# Patient Record
Sex: FEMALE | URBAN - METROPOLITAN AREA
[De-identification: names, ages, dates, MRNs, and addresses within clinical notes are randomized per-mention and may not be internally consistent; named-entity substitution may affect disease eponyms.]

---

## 2017-11-03 ENCOUNTER — OUTPATIENT (OUTPATIENT)
Dept: OUTPATIENT SERVICES | Facility: HOSPITAL | Age: 52
LOS: 1 days | Discharge: HOME | End: 2017-11-03

## 2017-11-03 DIAGNOSIS — M21.6X1 OTHER ACQUIRED DEFORMITIES OF RIGHT FOOT: ICD-10-CM

## 2017-11-03 DIAGNOSIS — Z01.818 ENCOUNTER FOR OTHER PREPROCEDURAL EXAMINATION: ICD-10-CM

## 2017-11-03 DIAGNOSIS — M21.611 BUNION OF RIGHT FOOT: ICD-10-CM

## 2017-11-17 ENCOUNTER — OUTPATIENT (OUTPATIENT)
Dept: OUTPATIENT SERVICES | Facility: HOSPITAL | Age: 52
LOS: 1 days | Discharge: HOME | End: 2017-11-17

## 2017-11-21 DIAGNOSIS — M21.621 BUNIONETTE OF RIGHT FOOT: ICD-10-CM

## 2017-11-21 DIAGNOSIS — Z87.891 PERSONAL HISTORY OF NICOTINE DEPENDENCE: ICD-10-CM

## 2017-11-21 DIAGNOSIS — M21.6X1 OTHER ACQUIRED DEFORMITIES OF RIGHT FOOT: ICD-10-CM

## 2019-12-13 PROBLEM — Z00.00 ENCOUNTER FOR PREVENTIVE HEALTH EXAMINATION: Status: ACTIVE | Noted: 2019-12-13

## 2022-03-28 ENCOUNTER — APPOINTMENT (OUTPATIENT)
Dept: OTOLARYNGOLOGY | Facility: CLINIC | Age: 57
End: 2022-03-28
Payer: COMMERCIAL

## 2022-03-28 VITALS — WEIGHT: 136 LBS | BODY MASS INDEX: 21.35 KG/M2 | HEIGHT: 67 IN

## 2022-03-28 DIAGNOSIS — J34.89 OTHER SPECIFIED DISORDERS OF NOSE AND NASAL SINUSES: ICD-10-CM

## 2022-03-28 DIAGNOSIS — K21.9 GASTRO-ESOPHAGEAL REFLUX DISEASE W/OUT ESOPHAGITIS: ICD-10-CM

## 2022-03-28 DIAGNOSIS — Z78.9 OTHER SPECIFIED HEALTH STATUS: ICD-10-CM

## 2022-03-28 DIAGNOSIS — R07.0 PAIN IN THROAT: ICD-10-CM

## 2022-03-28 PROCEDURE — 99204 OFFICE O/P NEW MOD 45 MIN: CPT | Mod: 25

## 2022-03-28 PROCEDURE — 31575 DIAGNOSTIC LARYNGOSCOPY: CPT

## 2022-03-28 NOTE — HISTORY OF PRESENT ILLNESS
[de-identified] : Patient presents today with c/o throat discomfort. Patient admits present for 3 months. Only happens when going to bed. She feels an irritation and dryness in her throat. hx of asthma followed by pulmonologist on breo daily. \par Patient also c/o nasal obstruction on the left nostril. No nasal injury. Currently not on any nasal sprays or allergy medications. \par

## 2022-03-28 NOTE — PHYSICAL EXAM
[Nasal Endoscopy Performed] : nasal endoscopy was performed, see procedure section for findings [Normal] : mucosa is normal [Midline] : trachea located in midline position [de-identified] : edema

## 2022-05-09 ENCOUNTER — APPOINTMENT (OUTPATIENT)
Dept: OTOLARYNGOLOGY | Facility: CLINIC | Age: 57
End: 2022-05-09

## 2022-07-20 ENCOUNTER — NON-APPOINTMENT (OUTPATIENT)
Age: 57
End: 2022-07-20

## 2024-05-20 ENCOUNTER — APPOINTMENT (OUTPATIENT)
Dept: CARDIOLOGY | Facility: CLINIC | Age: 59
End: 2024-05-20

## 2024-06-07 ENCOUNTER — APPOINTMENT (OUTPATIENT)
Dept: CARDIOLOGY | Facility: CLINIC | Age: 59
End: 2024-06-07
Payer: COMMERCIAL

## 2024-06-07 VITALS
DIASTOLIC BLOOD PRESSURE: 80 MMHG | HEART RATE: 72 BPM | HEIGHT: 67 IN | BODY MASS INDEX: 19.78 KG/M2 | WEIGHT: 126 LBS | SYSTOLIC BLOOD PRESSURE: 122 MMHG

## 2024-06-07 DIAGNOSIS — Z87.891 PERSONAL HISTORY OF NICOTINE DEPENDENCE: ICD-10-CM

## 2024-06-07 DIAGNOSIS — R06.02 SHORTNESS OF BREATH: ICD-10-CM

## 2024-06-07 DIAGNOSIS — Z82.49 FAMILY HISTORY OF ISCHEMIC HEART DISEASE AND OTHER DISEASES OF THE CIRCULATORY SYSTEM: ICD-10-CM

## 2024-06-07 DIAGNOSIS — R07.9 CHEST PAIN, UNSPECIFIED: ICD-10-CM

## 2024-06-07 DIAGNOSIS — Z82.3 FAMILY HISTORY OF STROKE: ICD-10-CM

## 2024-06-07 PROCEDURE — 93000 ELECTROCARDIOGRAM COMPLETE: CPT

## 2024-06-07 PROCEDURE — 99204 OFFICE O/P NEW MOD 45 MIN: CPT | Mod: 25

## 2024-06-07 NOTE — REVIEW OF SYSTEMS
[SOB] : shortness of breath [Chest Discomfort] : chest discomfort [Negative] : Heme/Lymph [Lower Ext Edema] : no extremity edema [Leg Claudication] : no intermittent leg claudication [Palpitations] : no palpitations [Confusion] : no confusion was observed

## 2024-06-07 NOTE — HISTORY OF PRESENT ILLNESS
[FreeTextEntry1] : 59-year-old with a PMHx of asthma, HLD. FMHx: Father  at 57yo of MI  PCP Dr. Hitchcock.   Pt presents for a cardiac evaluation. She is c/o episodes of midsternal chest pressure and left upper back pain, she also reports episodes of SOB at rest where she has to take a deep breath. She exercises in the gym and takes walks with her dogs.   TSH 1.84

## 2024-06-07 NOTE — ASSESSMENT
[FreeTextEntry1] : 59 YOF with mild HLD, FH of CAD. Episodes of chest pain and SOB.  Plan: Low-fat diet discussed. Exercise stress ECHO. F/u after the test.  Gustavo Serrato MD

## 2024-07-01 ENCOUNTER — APPOINTMENT (OUTPATIENT)
Dept: CARDIOLOGY | Facility: CLINIC | Age: 59
End: 2024-07-01
Payer: COMMERCIAL

## 2024-07-01 DIAGNOSIS — R06.02 SHORTNESS OF BREATH: ICD-10-CM

## 2024-07-01 DIAGNOSIS — R07.9 CHEST PAIN, UNSPECIFIED: ICD-10-CM

## 2024-07-01 PROCEDURE — 93325 DOPPLER ECHO COLOR FLOW MAPG: CPT

## 2024-07-01 PROCEDURE — 93351 STRESS TTE COMPLETE: CPT

## 2024-07-01 PROCEDURE — 93320 DOPPLER ECHO COMPLETE: CPT

## 2024-08-02 ENCOUNTER — APPOINTMENT (OUTPATIENT)
Dept: UROLOGY | Facility: CLINIC | Age: 59
End: 2024-08-02

## 2024-08-05 ENCOUNTER — APPOINTMENT (OUTPATIENT)
Dept: UROLOGY | Facility: CLINIC | Age: 59
End: 2024-08-05

## 2024-08-05 PROBLEM — R35.0 URINARY FREQUENCY: Status: ACTIVE | Noted: 2024-08-05

## 2024-08-05 PROBLEM — N20.0 NEPHROLITHIASIS: Status: ACTIVE | Noted: 2024-08-05

## 2024-08-05 PROCEDURE — 81003 URINALYSIS AUTO W/O SCOPE: CPT | Mod: QW

## 2024-08-05 PROCEDURE — 99204 OFFICE O/P NEW MOD 45 MIN: CPT | Mod: 25

## 2024-08-05 PROCEDURE — G2211 COMPLEX E/M VISIT ADD ON: CPT | Mod: NC

## 2024-08-05 NOTE — ASSESSMENT
[FreeTextEntry1] : JIMENA ALVARADO is a 59-year-old female who presents for consultation for left cortical calcification, possible stone, LUTS.  Suspecting patient's right lower back pain was musculoskeletal. Patient is currently asymptomatic. I explained to the patient that differential diagnosis includes sonogram artifact versus parenchymal calcification versus stone.  In light of the fact that she has no symptoms, we will observe for now to avoid radiation however if she develops symptoms in the future will recommend repeating imaging earlier. We also discussed risks of obstruction with observation of stones, however this calcification appears small. Today we also focused on stone dietary prevention Regarding her chronic urinary frequency, does not particularly bothersome and she will work on decreasing bladder irritants. UA UCx today  - f/u 1 year with RBUS and KUB prior. If symptoms worsen will have f/u sooner, consider CT AP at that point.   Stone nutritional counseling given-- She will DC MULTIVITAMINS First and foremost, the most important recommendation is to increase water intake. I have recommended that patient drink enough water to produce 2.5L of urine per day. More easily put, I have recommended the patient consume enough water to keep urine clear. I have also recommended adding crystal light (or lemon) which contains citrate which prevents stone formation. I have also stressed the importance of minimizing salt and animal protein in the diet.

## 2024-08-05 NOTE — HISTORY OF PRESENT ILLNESS
[FreeTextEntry1] : JIMENA ALVARADO is a 59-year-old female who presents for consultation for left cortical calcification, possible stone, LUTS.  Pt was having liver testing done where kidney stone was incidentally detected. She was monitoring this stone and developed right sided positional lower back pain last month. This occurred after lifting a heavy object and pain has since resolved. This prompted her making appt.  She has had chronic urinary frequency, nocturia 0-1x, no urgency or other symptoms. She has significant fluid intake such as coffee and tea. Denies flank pain, gross hematuria, dysuria or associated symptoms.   RBUS 02/07/2024 - images independently reviewed by me - On my read, I agree with the findings. this appears to be a peripheral cortical calcification.  IMPRESSION: Stable 4 mm cortical calcification in the interpolar right kidney. Otherwise, unremarkable appearance of both kidneys. PVR 7 cc.     history: Denies previous kidney stones, recurrent UTIs. No instrumentation. Family History: denies  malignancies. Social History: [place]ian speaking, works as

## 2024-08-05 NOTE — ADDENDUM
[FreeTextEntry1] : Patient's note was transcribed with the assistance of a medical scribe under the supervision of Dr. Yancey. I, Dr. Yancey, have reviewed the patient's chart and agree that it aligns with my medical decisions. Williams Rico, our scribe, also served as a chaperone for physical examination purposes.

## 2024-08-06 ENCOUNTER — APPOINTMENT (OUTPATIENT)
Dept: CARDIOLOGY | Facility: CLINIC | Age: 59
End: 2024-08-06

## 2024-08-06 PROBLEM — I34.1 MITRAL VALVE PROLAPSE: Status: ACTIVE | Noted: 2024-08-06

## 2024-08-06 PROBLEM — J45.909 ASTHMA: Status: ACTIVE | Noted: 2024-08-06

## 2024-08-06 PROBLEM — I25.3 ATRIAL SEPTAL ANEURYSM: Status: ACTIVE | Noted: 2024-08-06

## 2024-08-06 PROBLEM — E78.5 HYPERLIPIDEMIA: Status: ACTIVE | Noted: 2024-08-06

## 2024-08-06 PROCEDURE — 99214 OFFICE O/P EST MOD 30 MIN: CPT | Mod: 25

## 2024-08-06 PROCEDURE — 93000 ELECTROCARDIOGRAM COMPLETE: CPT

## 2024-08-06 NOTE — REVIEW OF SYSTEMS
[SOB] : shortness of breath [Chest Discomfort] : chest discomfort [Negative] : Gastrointestinal [Lower Ext Edema] : no extremity edema [Leg Claudication] : no intermittent leg claudication [Palpitations] : no palpitations

## 2024-08-06 NOTE — HISTORY OF PRESENT ILLNESS
[FreeTextEntry1] : 59-year-old with a PMHx of asthma, HLD. FMHx: Father  at 55yo of MI  PCP Dr. Hitchcock.   Pt presents for follow up visit. Pt reports chest tightness. Denies SOB since last visit. Pt has hx of asthma, does not use inhalers states its controlled, used to follow with pulmonologist. Pt reports exercise regularly in the gym and takes walks with her dogs.   24:  MAHOGANY: -13 min, peak HR 167bpm 103% MPHR, 13.6 METs -Resting ECG revealed sinus rhythm with prior lateral infarct, RVCD and no significant ST changes. At peak stress, the electrocardiogram revealed sinus tachycardia with no significant ischemic ST segment changes. No echocardiographic evidence of exercise induced ischemia. -LVEF 61%, interatrial septum is aneurysmal, prolapse of the middle scallop (P2) of the posterior mitral leaflet, mild MR, mild TR    TSH 1.84

## 2025-08-09 ENCOUNTER — NON-APPOINTMENT (OUTPATIENT)
Age: 60
End: 2025-08-09

## 2025-08-11 ENCOUNTER — APPOINTMENT (OUTPATIENT)
Dept: UROLOGY | Facility: CLINIC | Age: 60
End: 2025-08-11

## 2025-08-21 ENCOUNTER — APPOINTMENT (OUTPATIENT)
Dept: CARDIOLOGY | Facility: CLINIC | Age: 60
End: 2025-08-21
Payer: COMMERCIAL

## 2025-08-21 VITALS
HEIGHT: 67 IN | SYSTOLIC BLOOD PRESSURE: 128 MMHG | HEART RATE: 58 BPM | BODY MASS INDEX: 19.3 KG/M2 | DIASTOLIC BLOOD PRESSURE: 92 MMHG | WEIGHT: 123 LBS

## 2025-08-21 DIAGNOSIS — I25.3 ANEURYSM OF HEART: ICD-10-CM

## 2025-08-21 DIAGNOSIS — R07.9 CHEST PAIN, UNSPECIFIED: ICD-10-CM

## 2025-08-21 DIAGNOSIS — E78.5 HYPERLIPIDEMIA, UNSPECIFIED: ICD-10-CM

## 2025-08-21 DIAGNOSIS — I34.1 NONRHEUMATIC MITRAL (VALVE) PROLAPSE: ICD-10-CM

## 2025-08-21 DIAGNOSIS — R06.02 SHORTNESS OF BREATH: ICD-10-CM

## 2025-08-21 PROCEDURE — 99214 OFFICE O/P EST MOD 30 MIN: CPT | Mod: 25

## 2025-08-21 PROCEDURE — 93000 ELECTROCARDIOGRAM COMPLETE: CPT

## 2025-08-21 RX ORDER — PSYLLIUM HUSK 0.4 G
CAPSULE ORAL
Refills: 0 | Status: ACTIVE | COMMUNITY

## 2025-08-21 RX ORDER — MULTIVIT-MIN/IRON/FOLIC ACID/K 18-600-40
CAPSULE ORAL
Refills: 0 | Status: ACTIVE | COMMUNITY

## 2025-08-21 RX ORDER — PROGESTERONE 200 MG/1
CAPSULE ORAL
Refills: 0 | Status: ACTIVE | COMMUNITY

## 2025-08-21 RX ORDER — ESTRADIOL 0.03 MG/D
0.03 PATCH, EXTENDED RELEASE TRANSDERMAL
Refills: 0 | Status: ACTIVE | COMMUNITY

## 2025-08-21 RX ORDER — CHROMIUM 200 MCG
TABLET ORAL DAILY
Refills: 0 | Status: ACTIVE | COMMUNITY

## 2025-09-04 ENCOUNTER — APPOINTMENT (OUTPATIENT)
Dept: OTOLARYNGOLOGY | Facility: CLINIC | Age: 60
End: 2025-09-04
Payer: COMMERCIAL

## 2025-09-04 ENCOUNTER — NON-APPOINTMENT (OUTPATIENT)
Age: 60
End: 2025-09-04

## 2025-09-04 VITALS — WEIGHT: 125 LBS | BODY MASS INDEX: 19.62 KG/M2 | HEIGHT: 67 IN

## 2025-09-04 DIAGNOSIS — R07.0 PAIN IN THROAT: ICD-10-CM

## 2025-09-04 DIAGNOSIS — K21.9 GASTRO-ESOPHAGEAL REFLUX DISEASE W/OUT ESOPHAGITIS: ICD-10-CM

## 2025-09-04 PROCEDURE — 99203 OFFICE O/P NEW LOW 30 MIN: CPT | Mod: 25

## 2025-09-04 PROCEDURE — 31575 DIAGNOSTIC LARYNGOSCOPY: CPT
